# Patient Record
(demographics unavailable — no encounter records)

---

## 2018-05-27 NOTE — C.PDOC
History Of Present Illness


65 year old female presents to ED intoxicated looking for a place to stay. 

Patient reports she has been drinking today. Patient denies SI/HI, 

hallucinations, fever, CP, SOB. 


Time Seen by Provider: 05/27/18 19:15


Chief Complaint (Nursing): Substance Abuse


History Per: Patient


History/Exam Limitations: intoxication


Onset/Duration Of Symptoms: Hrs


Current Symptoms Are (Timing): Still Present


Suicide/Self Injury Attempted (Context): None


Modifying Factor(s): Alcohol


Associated Symptoms: denies: Depression, Suicidal Thoughts, Suicidal Plan


Recent travel outside of the [place]: No


Additional History Per: Patient





Past Medical History


Reviewed: Historical Data, Nursing Documentation, Vital Signs


Vital Signs: 


 Last Vital Signs











Temp  98 F   05/28/18 01:53


 


Pulse  78   05/28/18 01:53


 


Resp  18   05/28/18 01:53


 


BP  128/84   05/28/18 01:53


 


Pulse Ox  98   05/28/18 01:53














- Medical History


PMH: Anxiety, Depression, Diabetes, HTN


   Denies: Hepatitis, HIV, Seizures, Sexually Transmitted Disease


Surgical History: No Surg Hx





- [facility] Procedures








PHYSICAL THERAPY NEC (09/18/14)








Family History: States: Unknown Family Hx





- Social History


Hx Alcohol Use: Yes


Hx Substance Use: No





Review Of Systems


Constitutional: Negative for: Fever, Chills


Cardiovascular: Negative for: Chest Pain


Respiratory: Negative for: Shortness of Breath


Gastrointestinal: Negative for: Nausea, Vomiting


Psych: Negative for: Depression, Suicidal ideation





Physical Exam





- Physical Exam


Appears: Non-toxic, No Acute Distress


Skin: Warm, Dry


Head: Normacephalic


Eye(s): bilateral: Normal Inspection


Neck: Supple


Chest: Symmetrical


Cardiovascular: Rhythm Regular


Respiratory: No Rales, No Rhonchi, No Wheezing


Gastrointestinal/Abdominal: Soft, No Tenderness, No Guarding, No Rebound


Back: Normal Inspection


Extremity: Bilateral: Atraumatic, Normal Color And Temperature, Normal ROM


Neurological/Psych: Oriented x3


Gait: Unsteady (due to alcohol intoxication)





ED Course And Treatment


O2 Sat by Pulse Oximetry: 98 (ON RA)


Pulse Ox Interpretation: Normal


Reevaluation Time: 05:18


Reassessment Condition: Improved





Disposition


Counseled Patient/Family Regarding: Studies Performed, Diagnosis





- Disposition


Referrals: 


[facility] [Outside]


Disposition: HOME/ ROUTINE


Disposition Time: 19:26


Condition: FAIR


Instructions:  Alcohol Abuse and Alcoholism (DC)


Forms:  [facility] (English)


Print Language: Lao





- Clinical Impression


Clinical Impression: 


 Alcohol intoxication








- Scribe Statement


The provider has reviewed the documentation as recorded by the Scribe


Armando Munson





All medical record entries made by the Scribe were at my direction and 

personally dictated by me. I have reviewed the chart and agree that the record 

accurately reflects my personal performance of the history, physical exam, 

medical decision making, and the department course for this patient. I have 

also personally directed, reviewed, and agree with the discharge instructions 

and disposition.